# Patient Record
(demographics unavailable — no encounter records)

---

## 2025-05-18 NOTE — HISTORY OF PRESENT ILLNESS
[Mother] : mother [Yes] : Patient goes to dentist yearly [Toothpaste] : Primary Fluoride Source: Toothpaste [No] : No cigarette smoke exposure [FreeTextEntry7] : 7 yo c [FreeTextEntry1] :   EVELIA  is here for 8 year  well child visit   Patient is doing well at home. Past medical history reviewed. Immunizations up to date Oral: discussed brushing teeth twice per day, routine dental visits Behavior/ Activity: exercises 60 min a day, soccer, basketball Safety: appropriately restrained in motor vehicle . wear helmet Sleeping: discussed Urination and bowel moments normal Current grade: 504 plan, ADHD, 2nd Appetite good: veggies fruits decreased with med   Parent(s) have current concerns or issues: on ADHD meds, given after BF decrease appetite increase later in day 1. neuro adhd Mckayla Mendoza adjusting meds 2. cardiology Dr. Aguirre  follow up 10/2025

## 2025-05-18 NOTE — PHYSICAL EXAM
[TextEntry] : left retractile Gen: Awake, alert, not in distress well appearing Ears: bilateral tympanic membranes normal light reflex  normal canals Eyes: PERRL Pharynx: non erythematous, palate normal Neck: supple  Cardiac: normal rate, regular rhythm, S1,S2 normal, no murmur Lungs : clear to auscultation  Abdomen: soft, not tender, not distended, , no hepatosplenomegaly Musculoskeletal : full range of motion of hips, knees and ankles, normal gait Neuro: no focal deficits  Genitalia : Raza 1,right testicle palpable, left retractile testes, palpable with standing Back: no scoliosis, normal back

## 2025-05-18 NOTE — PLAN
[TextEntry] :  bmi decreased from 66 to 40%   COUNSELING/EDUCATION less electronics reviewed  5-2-1-0 Healthy Habits Questionnaire    cardiac screen reviewed and negative   The following 7 to 8 year anticipatory guidance topics were discussed and/or handouts given: school, development and mental health, nutrition and physical activity, oral health and safety. Counseling for nutrition and physical activity was provided.   CARE COORDINATION  reviewed   Immunizations reviewed    EVELIA  may participate age appropriate Activity without restrictions including Physical Education & Athletics Follow up in one year.

## 2025-05-18 NOTE — HISTORY OF PRESENT ILLNESS
[Mother] : mother [Yes] : Patient goes to dentist yearly [Toothpaste] : Primary Fluoride Source: Toothpaste [No] : No cigarette smoke exposure [FreeTextEntry7] : 9 yo c [FreeTextEntry1] :   EVELIA  is here for 8 year  well child visit   Patient is doing well at home. Past medical history reviewed. Immunizations up to date Oral: discussed brushing teeth twice per day, routine dental visits Behavior/ Activity: exercises 60 min a day, soccer, basketball Safety: appropriately restrained in motor vehicle . wear helmet Sleeping: discussed Urination and bowel moments normal Current grade: 504 plan, ADHD, 2nd Appetite good: veggies fruits decreased with med   Parent(s) have current concerns or issues: on ADHD meds, given after BF decrease appetite increase later in day 1. neuro adhd Mckayla Mendoza adjusting meds 2. cardiology Dr. Aguirre  follow up 10/2025